# Patient Record
Sex: MALE | Race: WHITE | NOT HISPANIC OR LATINO | Employment: FULL TIME | URBAN - METROPOLITAN AREA
[De-identification: names, ages, dates, MRNs, and addresses within clinical notes are randomized per-mention and may not be internally consistent; named-entity substitution may affect disease eponyms.]

---

## 2018-07-17 ENCOUNTER — APPOINTMENT (OUTPATIENT)
Dept: RADIOLOGY | Facility: MEDICAL CENTER | Age: 53
End: 2018-07-17
Attending: EMERGENCY MEDICINE
Payer: COMMERCIAL

## 2018-07-17 ENCOUNTER — HOSPITAL ENCOUNTER (EMERGENCY)
Facility: MEDICAL CENTER | Age: 53
End: 2018-07-18
Attending: EMERGENCY MEDICINE
Payer: COMMERCIAL

## 2018-07-17 DIAGNOSIS — S62.102A CLOSED FRACTURE OF LEFT WRIST, INITIAL ENCOUNTER: ICD-10-CM

## 2018-07-17 PROCEDURE — 71045 X-RAY EXAM CHEST 1 VIEW: CPT

## 2018-07-17 PROCEDURE — 73110 X-RAY EXAM OF WRIST: CPT | Mod: LT

## 2018-07-17 PROCEDURE — 305948 HCHG GREEN TRAUMA ACT PRE-NOTIFY NO CC

## 2018-07-17 PROCEDURE — 73501 X-RAY EXAM HIP UNI 1 VIEW: CPT | Mod: LT

## 2018-07-17 PROCEDURE — 99284 EMERGENCY DEPT VISIT MOD MDM: CPT

## 2018-07-17 ASSESSMENT — LIFESTYLE VARIABLES
HAVE YOU EVER FELT YOU SHOULD CUT DOWN ON YOUR DRINKING: NO
HAVE PEOPLE ANNOYED YOU BY CRITICIZING YOUR DRINKING: NO
AVERAGE NUMBER OF DAYS PER WEEK YOU HAVE A DRINK CONTAINING ALCOHOL: 7
EVER HAD A DRINK FIRST THING IN THE MORNING TO STEADY YOUR NERVES TO GET RID OF A HANGOVER: NO
TOTAL SCORE: 0
ON A TYPICAL DAY WHEN YOU DRINK ALCOHOL HOW MANY DRINKS DO YOU HAVE: 2
TOTAL SCORE: 0
CONSUMPTION TOTAL: NEGATIVE
TOTAL SCORE: 0
HOW MANY TIMES IN THE PAST YEAR HAVE YOU HAD 5 OR MORE DRINKS IN A DAY: 0
DO YOU DRINK ALCOHOL: YES
EVER FELT BAD OR GUILTY ABOUT YOUR DRINKING: NO

## 2018-07-17 ASSESSMENT — PAIN SCALES - GENERAL: PAINLEVEL_OUTOF10: 5

## 2018-07-18 VITALS
SYSTOLIC BLOOD PRESSURE: 154 MMHG | HEIGHT: 70 IN | WEIGHT: 186.73 LBS | TEMPERATURE: 97.3 F | BODY MASS INDEX: 26.73 KG/M2 | HEART RATE: 59 BPM | OXYGEN SATURATION: 93 % | RESPIRATION RATE: 18 BRPM | DIASTOLIC BLOOD PRESSURE: 82 MMHG

## 2018-07-18 PROCEDURE — 29125 APPL SHORT ARM SPLINT STATIC: CPT

## 2018-07-18 PROCEDURE — 302874 HCHG BANDAGE ACE 2 OR 3""

## 2018-07-18 PROCEDURE — 302875 HCHG BANDAGE ACE 4 OR 6""

## 2018-07-18 RX ORDER — HYDROCODONE BITARTRATE AND ACETAMINOPHEN 5; 325 MG/1; MG/1
1-2 TABLET ORAL EVERY 4 HOURS PRN
Qty: 16 TAB | Refills: 0 | Status: SHIPPED | OUTPATIENT
Start: 2018-07-18 | End: 2018-07-22

## 2018-07-18 RX ORDER — IBUPROFEN 800 MG/1
800 TABLET ORAL EVERY 8 HOURS PRN
Qty: 30 TAB | Refills: 0 | Status: SHIPPED
Start: 2018-07-18

## 2018-07-18 NOTE — DISCHARGE INSTRUCTIONS
Cast or Splint Care  Casts and splints support injured limbs and keep bones from moving while they heal.   HOME CARE  · Keep the cast or splint uncovered during the drying period.  ¨ A plaster cast can take 24 to 48 hours to dry.  ¨ A fiberglass cast will dry in less than 1 hour.  · Do not rest the cast on anything harder than a pillow for 24 hours.  · Do not put weight on your injured limb. Do not put pressure on the cast. Wait for your doctor's approval.  · Keep the cast or splint dry.  ¨ Cover the cast or splint with a plastic bag during baths or wet weather.  ¨ If you have a cast over your chest and belly (trunk), take sponge baths until the cast is taken off.  ¨ If your cast gets wet, dry it with a towel or blow dryer. Use the cool setting on the blow dryer.  · Keep your cast or splint clean. Wash a dirty cast with a damp cloth.  · Do not put any objects under your cast or splint.  · Do not scratch the skin under the cast with an object. If itching is a problem, use a blow dryer on a cool setting over the itchy area.  · Do not trim or cut your cast.  · Do not take out the padding from inside your cast.  · Exercise your joints near the cast as told by your doctor.  · Raise (elevate) your injured limb on 1 or 2 pillows for the first 1 to 3 days.  GET HELP IF:  · Your cast or splint cracks.  · Your cast or splint is too tight or too loose.  · You itch badly under the cast.  · Your cast gets wet or has a soft spot.  · You have a bad smell coming from the cast.  · You get an object stuck under the cast.  · Your skin around the cast becomes red or sore.  · You have new or more pain after the cast is put on.  GET HELP RIGHT AWAY IF:  · You have fluid leaking through the cast.  · You cannot move your fingers or toes.  · Your fingers or toes turn blue or white or are cool, painful, or puffy (swollen).  · You have tingling or lose feeling (numbness) around the injured area.  · You have bad pain or pressure under the  cast.  · You have trouble breathing or have shortness of breath.  · You have chest pain.     This information is not intended to replace advice given to you by your health care provider. Make sure you discuss any questions you have with your health care provider.     Document Released: 04/18/2012 Document Revised: 08/20/2014 Document Reviewed: 06/26/2014  [employer] Patient Education ©2016 [employer]    Radial Fracture  A radial fracture is a break in the radius bone, which is the long bone of the forearm that is on the same side as your thumb. Your forearm is the part of your arm that is between your elbow and your wrist. It is made up of two bones: the radius and the ulna.  Most radial fractures occur near the wrist (distal radial fracture) or near the elbow (radial head fracture). A distal radial fracture is the most common type of broken arm. This fracture usually occurs about an inch above the wrist. Fractures of the middle part of the bone are less common.  What are the causes?  Falling with your arm outstretched is the most common cause of a radial fracture. Other causes include:  · Car accidents.  · Bike accidents.  · A direct blow to the middle part of the radius.  What increases the risk?  · You may be at greater risk for a distal radial fracture if you are 60 years of age or older.  · You may be at greater risk for a radial head fracture if you are:  ¨ Female.  ¨ 30-40 years old.  · You may be at a greater risk for all types of radial fractures if you have a condition that causes your bones to be weak or thin (osteoporosis).  What are the signs or symptoms?  A radial fracture causes pain immediately after the injury. Other signs and symptoms include:  · An abnormal bend or bump in your arm (deformity).  · Swelling.  · Bruising.  · Numbness or tingling.  · Tenderness.  · Limited movement.  How is this diagnosed?  Your health care provider may diagnose a radial fracture based on:  · Your  symptoms.  · Your medical history, including any recent injury.  · A physical exam. Your health care provider will look for any deformity and feel for tenderness over the break. Your health care provider will also check whether the bone is out of place.  · An X-ray exam to confirm the diagnosis and learn more about the type of fracture.  How is this treated?  The goals of treatment are to get the bone in proper position for healing and to keep it from moving so it will heal over time. Your treatment will depend on many factors, especially the type of fracture that you have.  · If the fractured bone:  ¨ Is in the correct position (nondisplaced), you may only need to wear a cast or a splint.  ¨ Has a slightly displaced fracture, you may need to have the bones moved back into place manually (closed reduction) before the splint or cast is put on.  · You may have a temporary splint before you have a plaster cast. The splint allows room for some swelling. After a few days, a cast can replace the splint.  ¨ You may have to wear the cast for about 6 weeks or as directed by your health care provider.  ¨ The cast may be changed after about 3 weeks or as directed by your health care provider.  · After your cast is taken off, you may need physical therapy to regain full movement in your wrist or elbow.  · You may need emergency surgery if you have:  ¨ A fractured bone that is out of position (displaced).  ¨ A fracture with multiple fragments (comminuted fracture).  ¨ A fracture that breaks the skin (open fracture). This type of fracture may require surgical wires, plates, or screws to hold the bone in place.  · You may have X-rays every couple of weeks to check on your healing.  Follow these instructions at home:  · Keep the injured arm above the level of your heart while you are sitting or lying down. This helps to reduce swelling and pain.  · Apply ice to the injured area:  ¨ Put ice in a plastic bag.  ¨ Place a towel between  your skin and the bag.  ¨ Leave the ice on for 20 minutes, 2-3 times per day.  · Move your fingers often to avoid stiffness and to minimize swelling.  · If you have a plaster or fiberglass cast:  ¨ Do not try to scratch the skin under the cast using sharp or pointed objects.  ¨ Check the skin around the cast every day. You may put lotion on any red or sore areas.  ¨ Keep your cast dry and clean.  · If you have a plaster splint:  ¨ Wear the splint as directed.  ¨ Loosen the elastic around the splint if your fingers become numb and tingle, or if they turn cold and blue.  · Do not put pressure on any part of your cast until it is fully hardened. Rest your cast only on a pillow for the first 24 hours.  · Protect your cast or splint while bathing or showering, as directed by your health care provider. Do not put your cast or splint into water.  · Take medicines only as directed by your health care provider.  · Return to activities, such as sports, as directed by your health care provider. Ask your health care provider what activities are safe for you.  · Keep all follow-up visits as directed by your health care provider. This is important.  Contact a health care provider if:  · Your pain medicine is not helping.  · Your cast gets damaged or it breaks.  · Your cast becomes loose.  · Your cast gets wet.  · You have more severe pain or swelling than you did before the cast.  · You have severe pain when stretching your fingers.  · You continue to have pain or stiffness in your elbow or your wrist after your cast is taken off.  Get help right away if:  · You cannot move your fingers.  · You lose feeling in your fingers or your hand.  · Your hand or your fingers turn cold and pale or blue.  · You notice a bad smell coming from your cast.  · You have drainage from underneath your cast.  · You have new stains from blood or drainage seeping through your cast.  This information is not intended to replace advice given to you by  your health care provider. Make sure you discuss any questions you have with your health care provider.  Document Released: 05/31/2007 Document Revised: 05/23/2017 Document Reviewed: 06/12/2015  [employer] Interactive Patient Education © 2017 [employer]
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
## 2018-07-18 NOTE — ED NOTES
Phoenix Willis discharged via ambulatory with wife.  Discharge instructions given and reviewed, patient educated to follow up with ortho, verbalized understanding.  Prescriptions given x 2.  All personal belongings in possession.  No questions at this time.

## 2018-07-18 NOTE — ED NOTES
Patient resting quietly in bed without distress, denies any complaints or needs at this time.  Call bell within reach.  Ambulatory to the restroom.  Waiting for results
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
## 2018-07-18 NOTE — ED NOTES
Iliana fall assessment completed. Pt is not high risk for fall at this time.  Bed locked in low position, call light in place. Personal possessions in place.  Personal needs assessed. Safety assessed. Will monitor frequently

## 2021-03-15 DIAGNOSIS — Z23 NEED FOR VACCINATION: ICD-10-CM

## 2021-03-24 ENCOUNTER — IMMUNIZATION (OUTPATIENT)
Dept: FAMILY PLANNING/WOMEN'S HEALTH CLINIC | Facility: IMMUNIZATION CENTER | Age: 56
End: 2021-03-24
Attending: INTERNAL MEDICINE
Payer: OTHER GOVERNMENT

## 2021-03-24 DIAGNOSIS — Z23 ENCOUNTER FOR VACCINATION: Primary | ICD-10-CM

## 2021-03-24 DIAGNOSIS — Z23 NEED FOR VACCINATION: ICD-10-CM

## 2021-03-24 PROCEDURE — 0001A PFIZER SARS-COV-2 VACCINE: CPT

## 2021-03-24 PROCEDURE — 91300 PFIZER SARS-COV-2 VACCINE: CPT
